# Patient Record
(demographics unavailable — no encounter records)

---

## 2025-01-03 NOTE — REASON FOR VISIT
[Follow-Up Visit] : a follow-up visit for [Osteoarthritis, Knee] : osteoarthritis of the knee [Other: ____] : [unfilled]

## 2025-01-05 NOTE — PHYSICAL EXAM
[de-identified] : General appearance: well nourished and hydrated, pleasant, alert and oriented x 3, cooperative.   HEENT: normocephalic, EOM intact, wearing mask, external auditory canal clear.   Cardiovascular: no lower leg edema, no varicosities, dorsalis pedis pulses palpable and symmetric.   Lymphatics: no palpable lymphadenopathy, no lymphedema.   Neurologic: sensation is normal, no muscle weakness in upper or lower extremities, patella tendon reflexes present and symmetric.   Dermatologic: skin moist, warm, no rash.   Spine: cervical spine with normal lordosis and painless range of motion, thoracic spine with normal kyphosis and painless range of motion, lumbosacral spine with normal lordosis and painless range of motion.  No tenderness to palpation along midline spine and paraspinal musculature.  Sacroiliac joints nontender bilaterally. Negative SLR and crossed SLR tests bilaterally. Gait: She does present using a collapsible cane. Without the cane, she demonstrates a cautious gait pattern without specific antalgia.  Right knee:  - Focal soft tissue swelling: none - Baker cyst: No palpable Baker's cysts - Ecchymosis: none - Erythema: none - Effusion: none - Wounds: well healed midline incision, benign appearing. - Alignment: normal - Tenderness: none - ROM: 0-120 - Collateral laxity: stable - Cruciate laxity: stable - Popliteal angle (degrees): 15 - Quad strength: 5/5 - Extensor lag: none  Left knee: - Focal soft tissue swelling: none - Baker cyst: No palpable Baker's cysts - Ecchymosis: none - Erythema: none - Effusion: trace - Wounds: none - Alignment: normal - Tenderness: Medial joint line tenderness and palpation, no lateral joint line tenderness.  She has crepitus but no pain with patella compression test.  - ROM: 0-130 - Collateral laxity: stable - Cruciate laxity: stable - Popliteal angle (degrees): 20 - Quad strength: 5/5 - Extensor lag: none   [de-identified] : Bilateral knee X rays were taken today. These demonstrate stable appearance of her right total knee replacement as compared to prior imaging without evidence of mechanical complication. Patella sits at unchanged height and tracks centrally. Left knee demonstrates mild varus alignment, tricompartmental osteoarthritis, bone-on-bone medially, K&L 4, patella sits at normal height and tracks centrally.
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
## 2025-01-05 NOTE — END OF VISIT
[FreeTextEntry3] : Documented by Billie Doyle acting as a scribe for Dr. Erik Lentz. 01/03/2025  All medical record entries made by the Scribe were at my, Dr. Erik Lentz, direction and personally dictated by me on 01/03/2025. I have reviewed the chart and agree that the record accurately reflects my personal performance of the history, physical exam, assessment and plan. I have also personally directed, reviewed, and agreed with the chart.

## 2025-01-05 NOTE — PHYSICAL EXAM
[de-identified] : General appearance: well nourished and hydrated, pleasant, alert and oriented x 3, cooperative.   HEENT: normocephalic, EOM intact, wearing mask, external auditory canal clear.   Cardiovascular: no lower leg edema, no varicosities, dorsalis pedis pulses palpable and symmetric.   Lymphatics: no palpable lymphadenopathy, no lymphedema.   Neurologic: sensation is normal, no muscle weakness in upper or lower extremities, patella tendon reflexes present and symmetric.   Dermatologic: skin moist, warm, no rash.   Spine: cervical spine with normal lordosis and painless range of motion, thoracic spine with normal kyphosis and painless range of motion, lumbosacral spine with normal lordosis and painless range of motion.  No tenderness to palpation along midline spine and paraspinal musculature.  Sacroiliac joints nontender bilaterally. Negative SLR and crossed SLR tests bilaterally. Gait: She does present using a collapsible cane. Without the cane, she demonstrates a cautious gait pattern without specific antalgia.  Right knee:  - Focal soft tissue swelling: none - Baker cyst: No palpable Baker's cysts - Ecchymosis: none - Erythema: none - Effusion: none - Wounds: well healed midline incision, benign appearing. - Alignment: normal - Tenderness: none - ROM: 0-120 - Collateral laxity: stable - Cruciate laxity: stable - Popliteal angle (degrees): 15 - Quad strength: 5/5 - Extensor lag: none  Left knee: - Focal soft tissue swelling: none - Baker cyst: No palpable Baker's cysts - Ecchymosis: none - Erythema: none - Effusion: trace - Wounds: none - Alignment: normal - Tenderness: Medial joint line tenderness and palpation, no lateral joint line tenderness.  She has crepitus but no pain with patella compression test.  - ROM: 0-130 - Collateral laxity: stable - Cruciate laxity: stable - Popliteal angle (degrees): 20 - Quad strength: 5/5 - Extensor lag: none   [de-identified] : Bilateral knee X rays were taken today. These demonstrate stable appearance of her right total knee replacement as compared to prior imaging without evidence of mechanical complication. Patella sits at unchanged height and tracks centrally. Left knee demonstrates mild varus alignment, tricompartmental osteoarthritis, bone-on-bone medially, K&L 4, patella sits at normal height and tracks centrally.
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
## 2025-01-05 NOTE — DISCUSSION/SUMMARY
Spoke with mom regarding negative COVID result   [de-identified] : IMP: 69-year-old female, now about two years status post right total knee replacement, doing well, with progressively worsening left knee osteoarthritis. Symptoms and pattern predominantly medial compartment. - At this point, I consider her to be a candidate for either a left medial unicompartmental knee replacement with Eleazar robotic assistance. - The relative merits of partial and total knee replacement were discussed in detail. The patient was informed that partial knee replacement preserves bone and cartilage as compared to total knee replacement and maintains the function of the anterior cruciate ligament. In appropriately selected patients, partial knee replacement can result in a faster, less painful recovery and a more "normal" feel to the knee with improved function in deep flexion activities. The patient was also informed that the published 10 year reoperation rate is somewhat higher for partial knee replacement, with fixation failure, progression of disease affecting un-resurfaced portions of the joint, polyethylene wear and unexplained pain being the most common causes of revision surgery. The patient was informed that conversion of partial knee replacement to total knee replacement is typically more complex than primary total knee replacement but less complex and invasive than revision of total knee replacement. We discussed the details of both procedures, the expected recovery periods, and the expected outcomes. We discussed the likelihood of satisfaction after complete recovery, and the potential causes of dissatisfaction. The importance of active patient participation in the rehabilitation protocol was emphasized, along with its influence on short and long-term outcomes.  We discussed the possibility that intra-operative findings would dictate conversion to total knee replacement, and the influence that change would have on recovery and long term outcome. Specific risks of partial and total knee replacement were discussed in detail. We discussed the risk of surgical site complications including but not limited to: surgical site infection, wound healing complications, bone fracture, tendon or ligament injury, neurovascular injury, hemorrhage, postoperative stiffness or instability, persistent pain and need for reoperation or manipulation under anesthesia. We discussed that it is typical to develop numbness of the knee lateral to the incision, which in most cases does improve over the course of the first postoperative year, but which can also be permanent. We discussed surgical blood loss and the small risk of requiring blood transfusion. We discussed the risk of perioperative medical complications, including but not limited to catheter-associated urinary tract infection, venous thromboembolism and other cardiopulmonary complications. We discussed anesthetic options and the small risk of anesthesia-related complications. We discussed the durability of knee replacements and limitations related to progression of arthritis in remaining compartments, polyethylene wear, osteolysis, loosening and unexplained pain. The role of the robot was discussed with respect to bone preparation and ligament balancing. The patient was given a copy of my preoperative packet with additional information about the procedure.  The patient gave informed consent for the surgical procedure and was instructed to speak to my surgical coordinator to arrange the logistics of surgical scheduling, presurgical testing, and medical optimization and clearance. - We did also discuss that total knee replacement would also be reasonable, especially given the excellent result with her right TKA. We discussed the details of the procedure, the expected recovery period, and the expected outcome. We discussed the likelihood of satisfaction after complete recovery, and the potential causes of dissatisfaction. The importance of active patient participation in the rehabilitation protocol was emphasized, along with its influence on short and long-term outcomes. Specific risks of total knee replacement were discussed in detail. We discussed the risk of surgical site complications including but not limited to: surgical site infection, wound healing complications, bone fracture, tendon or ligament injury, neurovascular injury, hemorrhage, postoperative stiffness or instability, persistent pain and need for reoperation or manipulation under anesthesia. We discussed that it is typical to develop numbness of the knee lateral to the incision, which in most cases does improve over the course of the first postoperative year, but which can also be permanent. We discussed surgical blood loss and the possible need for blood transfusion. We discussed the risk of perioperative medical complications, including but not limited to catheter-associated urinary tract infection, venous thromboembolism and other cardiopulmonary complications. We discussed anesthetic options and the risk of anesthesia-related complications. We discussed implant fixation methods; my plan would be to use fully cemented fixation in this case. We discussed the variable need to resurface the patella; my plan would be to leave the patella unresurfaced in this case. We discussed the durability of prosthetic knees and limitations related to wear, osteolysis and loosening.  All questions were answered the patient's satisfaction.  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 - For the moment I recommend that she continue with physical therapy and home exercises as well as multi-modal pain management as laid out by her pain management provider.  - Since cortisone shots and gel shots have both failed for her, I do not think that these bear further repetition.  - I encourage her to follow up with me once her daughter's situation is more stable, at which point we can consider booking her for a partial versus total left knee replacement.

## 2025-01-05 NOTE — DISCUSSION/SUMMARY
[de-identified] : IMP: 69-year-old female, now about two years status post right total knee replacement, doing well, with progressively worsening left knee osteoarthritis. Symptoms and pattern predominantly medial compartment. - At this point, I consider her to be a candidate for either a left medial unicompartmental knee replacement with Eleazar robotic assistance. - The relative merits of partial and total knee replacement were discussed in detail. The patient was informed that partial knee replacement preserves bone and cartilage as compared to total knee replacement and maintains the function of the anterior cruciate ligament. In appropriately selected patients, partial knee replacement can result in a faster, less painful recovery and a more "normal" feel to the knee with improved function in deep flexion activities. The patient was also informed that the published 10 year reoperation rate is somewhat higher for partial knee replacement, with fixation failure, progression of disease affecting un-resurfaced portions of the joint, polyethylene wear and unexplained pain being the most common causes of revision surgery. The patient was informed that conversion of partial knee replacement to total knee replacement is typically more complex than primary total knee replacement but less complex and invasive than revision of total knee replacement. We discussed the details of both procedures, the expected recovery periods, and the expected outcomes. We discussed the likelihood of satisfaction after complete recovery, and the potential causes of dissatisfaction. The importance of active patient participation in the rehabilitation protocol was emphasized, along with its influence on short and long-term outcomes.  We discussed the possibility that intra-operative findings would dictate conversion to total knee replacement, and the influence that change would have on recovery and long term outcome. Specific risks of partial and total knee replacement were discussed in detail. We discussed the risk of surgical site complications including but not limited to: surgical site infection, wound healing complications, bone fracture, tendon or ligament injury, neurovascular injury, hemorrhage, postoperative stiffness or instability, persistent pain and need for reoperation or manipulation under anesthesia. We discussed that it is typical to develop numbness of the knee lateral to the incision, which in most cases does improve over the course of the first postoperative year, but which can also be permanent. We discussed surgical blood loss and the small risk of requiring blood transfusion. We discussed the risk of perioperative medical complications, including but not limited to catheter-associated urinary tract infection, venous thromboembolism and other cardiopulmonary complications. We discussed anesthetic options and the small risk of anesthesia-related complications. We discussed the durability of knee replacements and limitations related to progression of arthritis in remaining compartments, polyethylene wear, osteolysis, loosening and unexplained pain. The role of the robot was discussed with respect to bone preparation and ligament balancing. The patient was given a copy of my preoperative packet with additional information about the procedure.  The patient gave informed consent for the surgical procedure and was instructed to speak to my surgical coordinator to arrange the logistics of surgical scheduling, presurgical testing, and medical optimization and clearance. - We did also discuss that total knee replacement would also be reasonable, especially given the excellent result with her right TKA. We discussed the details of the procedure, the expected recovery period, and the expected outcome. We discussed the likelihood of satisfaction after complete recovery, and the potential causes of dissatisfaction. The importance of active patient participation in the rehabilitation protocol was emphasized, along with its influence on short and long-term outcomes. Specific risks of total knee replacement were discussed in detail. We discussed the risk of surgical site complications including but not limited to: surgical site infection, wound healing complications, bone fracture, tendon or ligament injury, neurovascular injury, hemorrhage, postoperative stiffness or instability, persistent pain and need for reoperation or manipulation under anesthesia. We discussed that it is typical to develop numbness of the knee lateral to the incision, which in most cases does improve over the course of the first postoperative year, but which can also be permanent. We discussed surgical blood loss and the possible need for blood transfusion. We discussed the risk of perioperative medical complications, including but not limited to catheter-associated urinary tract infection, venous thromboembolism and other cardiopulmonary complications. We discussed anesthetic options and the risk of anesthesia-related complications. We discussed implant fixation methods; my plan would be to use fully cemented fixation in this case. We discussed the variable need to resurface the patella; my plan would be to leave the patella unresurfaced in this case. We discussed the durability of prosthetic knees and limitations related to wear, osteolysis and loosening.  All questions were answered the patient's satisfaction.  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 - For the moment I recommend that she continue with physical therapy and home exercises as well as multi-modal pain management as laid out by her pain management provider.  - Since cortisone shots and gel shots have both failed for her, I do not think that these bear further repetition.  - I encourage her to follow up with me once her daughter's situation is more stable, at which point we can consider booking her for a partial versus total left knee replacement.

## 2025-03-18 NOTE — DISCUSSION/SUMMARY
[de-identified] : Discussed my findings with the patient. XR reviewed. I am recommending a course of physical therapy, order given. Also given prescription for etodolac. She will follow up with me in 6-8 weeks if still having pain, sooner if there is an issue. Would order mri imaging at that time. All questions answered.

## 2025-03-18 NOTE — PHYSICAL EXAM
[de-identified] : General: patient is well developed, well nourished, in no acute  distress, alert and oriented x 3.    Mood and affect: normal    Respiratory: no respiratory distress noted    Skin: well healed incisions; no scars over spine, skin intact, no erythema, increased warmth    Alignment:The spine is well compensated in the coronal and sagittal plane.    Gait: The patient is able to toe walk and heel walk without difficulty.    Palpation: no tenderness to palpation spine or paraspinal region    Range of motion: Lumbar spine ROM is restricted    Neurologic Exam:  Motor: Manual Muscle testing in the lower extremities is 5 out of 5 in all muscle groups. There is no evidence of muscular atrophy in the lower extremities. Sensory: decreased sensation right S1 (chronic, unchanged); otherwise Sensation to light touch is grossly intact in the lower extremities    Reflexes: DTR are present and symmetric throughout    Hip Exam: No pain with internal or external rotation of hips bilaterally    Special tests: Straight leg raise test negative. Cross straight leg test negative. [de-identified] : XR 3/18/25: hardware intact, appears well fused; no adjacent segment disease

## 2025-03-18 NOTE — PHYSICAL EXAM
[de-identified] : General: patient is well developed, well nourished, in no acute  distress, alert and oriented x 3.    Mood and affect: normal    Respiratory: no respiratory distress noted    Skin: well healed incisions; no scars over spine, skin intact, no erythema, increased warmth    Alignment:The spine is well compensated in the coronal and sagittal plane.    Gait: The patient is able to toe walk and heel walk without difficulty.    Palpation: no tenderness to palpation spine or paraspinal region    Range of motion: Lumbar spine ROM is restricted    Neurologic Exam:  Motor: Manual Muscle testing in the lower extremities is 5 out of 5 in all muscle groups. There is no evidence of muscular atrophy in the lower extremities. Sensory: decreased sensation right S1 (chronic, unchanged); otherwise Sensation to light touch is grossly intact in the lower extremities    Reflexes: DTR are present and symmetric throughout    Hip Exam: No pain with internal or external rotation of hips bilaterally    Special tests: Straight leg raise test negative. Cross straight leg test negative. [de-identified] : XR 3/18/25: hardware intact, appears well fused; no adjacent segment disease

## 2025-03-18 NOTE — DISCUSSION/SUMMARY
[de-identified] : Discussed my findings with the patient. XR reviewed. I am recommending a course of physical therapy, order given. Also given prescription for etodolac. She will follow up with me in 6-8 weeks if still having pain, sooner if there is an issue. Would order mri imaging at that time. All questions answered.

## 2025-03-18 NOTE — HISTORY OF PRESENT ILLNESS
[de-identified] : 3/18/25: Patient returns to office, last seen in 2021. She is now approximately 6 years s/p L5/S1 ALIF, removal of L3-L5 hardware, L2-S1 psf. Was doing well until approximately 3 weeks ago. Now with left sided non-radiating low back pain. Also with isolated chronic left anterior knee pain (patient of Dr. Lentz).

## 2025-03-18 NOTE — END OF VISIT
[FreeTextEntry3] :   This note was written by Janice Zafar PA-C, acting as a scribe for Dr. Thiago Mayberry. I, Dr. Thiago Mayberry, have read and attest that all the information, medical decision-making, and discharge instructions within are true and accurate.  I, Dr. Thiago Mayberry, personally performed the evaluation and management (E/M) services for this new patient. That E/M includes conducting the initial examination, assessing all conditions, and establishing the plan of care. Today, my ACP, Janice Zafar PA-C, was here to observe my evaluation and management services for this patient to be followed going forward.

## 2025-03-18 NOTE — HISTORY OF PRESENT ILLNESS
[de-identified] : 3/18/25: Patient returns to office, last seen in 2021. She is now approximately 6 years s/p L5/S1 ALIF, removal of L3-L5 hardware, L2-S1 psf. Was doing well until approximately 3 weeks ago. Now with left sided non-radiating low back pain. Also with isolated chronic left anterior knee pain (patient of Dr. Lentz).

## 2025-05-13 NOTE — DISCUSSION/SUMMARY
[de-identified] : meloxicam PT Dr. Becker for left SI joint PRP injection f/u with me as needed, discussed surgery as last resort for left SI joint pain.

## 2025-05-13 NOTE — HISTORY OF PRESENT ILLNESS
[de-identified] : continues to have left buttock pain.  has not had NSAID or PT.  had left SI joint injection 3-4 weeks ago with temporary relief.

## 2025-05-13 NOTE — PHYSICAL EXAM
[de-identified] : General: patient is well developed, well nourished, in no acute  distress, alert and oriented x 3.    Mood and affect: normal    Respiratory: no respiratory distress noted    Skin: well healed incisions; no scars over spine, skin intact, no erythema, increased warmth    Alignment:The spine is well compensated in the coronal and sagittal plane.    Gait: The patient is able to toe walk and heel walk without difficulty.    Palpation: no tenderness to palpation spine or paraspinal region    Range of motion: Lumbar spine ROM is restricted    Neurologic Exam:  Motor: Manual Muscle testing in the lower extremities is 5 out of 5 in all muscle groups. There is no evidence of muscular atrophy in the lower extremities. Sensory: decreased sensation right S1 (chronic, unchanged); otherwise Sensation to light touch is grossly intact in the lower extremities    Reflexes: DTR are present and symmetric throughout    Hip Exam: No pain with internal or external rotation of hips bilaterally    Special tests: Straight leg raise test negative. Cross straight leg test negative. [de-identified] : XR 3/18/25: hardware intact, appears well fused; no adjacent segment disease.